# Patient Record
Sex: MALE | Race: ASIAN | NOT HISPANIC OR LATINO | URBAN - METROPOLITAN AREA
[De-identification: names, ages, dates, MRNs, and addresses within clinical notes are randomized per-mention and may not be internally consistent; named-entity substitution may affect disease eponyms.]

---

## 2023-07-26 ENCOUNTER — TELEPHONE (OUTPATIENT)
Dept: NEUROSURGERY | Facility: CLINIC | Age: 58
End: 2023-07-26
Payer: COMMERCIAL

## 2023-07-26 DIAGNOSIS — M54.2 CERVICALGIA: Primary | ICD-10-CM

## 2023-07-26 NOTE — TELEPHONE ENCOUNTER
Spoke to pt's friend and confirmed times, dates, and locations for XR and appt with Jaclyn for work up          ----- Message from Parul Humphries sent at 7/26/2023  9:56 AM CDT -----  Who Called: Pt friend, Togolese translation     What is the request in detail: Requesting call back to discuss being scheduled from referral. Pt needs MRI for shoulder. Please advise    Can the clinic reply by [facility]? No    Best Call Back Number: [phone]      Additional Information:

## 2023-08-02 ENCOUNTER — OFFICE VISIT (OUTPATIENT)
Dept: NEUROSURGERY | Facility: CLINIC | Age: 58
End: 2023-08-02
Payer: COMMERCIAL

## 2023-08-02 ENCOUNTER — HOSPITAL ENCOUNTER (OUTPATIENT)
Dept: RADIOLOGY | Facility: HOSPITAL | Age: 58
Discharge: HOME | End: 2023-08-02
Attending: NEUROLOGICAL SURGERY
Payer: COMMERCIAL

## 2023-08-02 VITALS
HEART RATE: 60 BPM | SYSTOLIC BLOOD PRESSURE: 120 MMHG | WEIGHT: 125 LBS | BODY MASS INDEX: 23 KG/M2 | TEMPERATURE: 97 F | DIASTOLIC BLOOD PRESSURE: 77 MMHG | HEIGHT: 62 IN

## 2023-08-02 DIAGNOSIS — M50.00 CERVICAL DISC DISEASE WITH MYELOPATHY: ICD-10-CM

## 2023-08-02 DIAGNOSIS — M54.2 CERVICALGIA: ICD-10-CM

## 2023-08-02 DIAGNOSIS — M54.2 CERVICALGIA: Primary | ICD-10-CM

## 2023-08-02 PROCEDURE — 1159F MED LIST DOCD IN RCRD: CPT | Mod: CPTII,S$GLB,, | Performed by: NEUROLOGICAL SURGERY

## 2023-08-02 PROCEDURE — 99204 OFFICE O/P NEW MOD 45 MIN: CPT | Mod: S$GLB,,, | Performed by: NEUROLOGICAL SURGERY

## 2023-08-02 PROCEDURE — 99204 PR OFFICE/OUTPT VISIT, NEW, LEVL IV, 45-59 MIN: ICD-10-PCS | Mod: S$GLB,,, | Performed by: NEUROLOGICAL SURGERY

## 2023-08-02 PROCEDURE — 1159F PR MEDICATION LIST DOCUMENTED IN MEDICAL RECORD: ICD-10-PCS | Mod: CPTII,S$GLB,, | Performed by: NEUROLOGICAL SURGERY

## 2023-08-02 PROCEDURE — 3078F PR MOST RECENT DIASTOLIC BLOOD PRESSURE < 80 MM HG: ICD-10-PCS | Mod: CPTII,S$GLB,, | Performed by: NEUROLOGICAL SURGERY

## 2023-08-02 PROCEDURE — 4010F PR ACE/ARB THEARPY RXD/TAKEN: ICD-10-PCS | Mod: CPTII,S$GLB,, | Performed by: NEUROLOGICAL SURGERY

## 2023-08-02 PROCEDURE — 4010F ACE/ARB THERAPY RXD/TAKEN: CPT | Mod: CPTII,S$GLB,, | Performed by: NEUROLOGICAL SURGERY

## 2023-08-02 PROCEDURE — 3008F BODY MASS INDEX DOCD: CPT | Mod: CPTII,S$GLB,, | Performed by: NEUROLOGICAL SURGERY

## 2023-08-02 PROCEDURE — 99999 PR PBB SHADOW E&M-EST. PATIENT-LVL III: ICD-10-PCS | Mod: PBBFAC,,, | Performed by: NEUROLOGICAL SURGERY

## 2023-08-02 PROCEDURE — 99999 PR PBB SHADOW E&M-EST. PATIENT-LVL III: CPT | Mod: PBBFAC,,, | Performed by: NEUROLOGICAL SURGERY

## 2023-08-02 PROCEDURE — 3008F PR BODY MASS INDEX (BMI) DOCUMENTED: ICD-10-PCS | Mod: CPTII,S$GLB,, | Performed by: NEUROLOGICAL SURGERY

## 2023-08-02 PROCEDURE — 72050 XR CERVICAL SPINE AP LAT WITH FLEX EXTEN: ICD-10-PCS | Mod: 26,,, | Performed by: RADIOLOGY

## 2023-08-02 PROCEDURE — 72050 X-RAY EXAM NECK SPINE 4/5VWS: CPT | Mod: 26,,, | Performed by: RADIOLOGY

## 2023-08-02 PROCEDURE — 3074F SYST BP LT 130 MM HG: CPT | Mod: CPTII,S$GLB,, | Performed by: NEUROLOGICAL SURGERY

## 2023-08-02 PROCEDURE — 3078F DIAST BP <80 MM HG: CPT | Mod: CPTII,S$GLB,, | Performed by: NEUROLOGICAL SURGERY

## 2023-08-02 PROCEDURE — 3074F PR MOST RECENT SYSTOLIC BLOOD PRESSURE < 130 MM HG: ICD-10-PCS | Mod: CPTII,S$GLB,, | Performed by: NEUROLOGICAL SURGERY

## 2023-08-02 PROCEDURE — 72050 X-RAY EXAM NECK SPINE 4/5VWS: CPT | Mod: TC

## 2023-08-02 RX ORDER — FLUTICASONE PROPIONATE 50 MCG
1 SPRAY, SUSPENSION (ML) NASAL 2 TIMES DAILY
COMMUNITY
Start: 2023-06-13

## 2023-08-02 RX ORDER — MELOXICAM 15 MG/1
15 TABLET ORAL
COMMUNITY
Start: 2023-06-13

## 2023-08-02 RX ORDER — ERGOCALCIFEROL 1.25 MG/1
50000 CAPSULE ORAL
COMMUNITY
Start: 2023-06-13

## 2023-08-02 RX ORDER — GABAPENTIN 300 MG/1
300 CAPSULE ORAL 3 TIMES DAILY
COMMUNITY
Start: 2023-03-11

## 2023-08-02 RX ORDER — AMLODIPINE AND BENAZEPRIL HYDROCHLORIDE 10; 20 MG/1; MG/1
1 CAPSULE ORAL
COMMUNITY
Start: 2023-06-13

## 2023-08-03 DIAGNOSIS — M50.10 CERVICAL DISC DISORDER WITH RADICULOPATHY: ICD-10-CM

## 2023-08-03 DIAGNOSIS — M47.12 SPONDYLOSIS, CERVICAL, WITH MYELOPATHY: Primary | ICD-10-CM

## 2023-08-03 DIAGNOSIS — M54.10 RADICULOPATHY, UNSPECIFIED SPINAL REGION: ICD-10-CM

## 2023-08-03 NOTE — PROGRESS NOTES
Neurosurgery  History & Physical    SUBJECTIVE:     Chief Complaint:  Patient was referred to us by his primary care physician for evaluation of cervical radiculopathy and cervical myelopathy.    History of Present Illness:  This is a 57-year-old German male who works as a .  Patient has had about a year history of worsening neck pain with radiation into the right arm.  He initially thought this was a shoulder problem and orthopedic workup which did not reveal anything significant with the shoulder.  He reports his pain has neck pain radiating into the right shoulder down the arm into the hands and fingers.  He is had physical therapy without relief.  He is had 2 injections without relief.  He is tried various medications.  Impairing his ability to work as a  now has to work light duties.  He would like to get back to his normal working job.  He denies any left-sided symptoms.  He is right-handed.  He does complain of increasing weakness of the right hand in his dropping things.    Review of patient's allergies indicates:  No Known Allergies    Current Outpatient Medications   Medication Sig Dispense Refill    amlodipine-benazepril 10-20mg (LOTREL) 10-20 mg per capsule Take 1 capsule by mouth.      fluticasone propionate (FLONASE) 50 mcg/actuation nasal spray 1 spray by Each Nostril route 2 (two) times daily.      gabapentin (NEURONTIN) 300 MG capsule Take 300 mg by mouth 3 (three) times daily.      meloxicam (MOBIC) 15 MG tablet Take 15 mg by mouth.      VITAMIN D2 1,250 mcg (50,000 unit) capsule Take 50,000 Units by mouth every 7 days.       No current facility-administered medications for this visit.       No past medical history on file.  No past surgical history on file.  Family History    None       Social History     Socioeconomic History    Marital status: Unknown   Tobacco Use    Smoking status: Never    Smokeless tobacco: Never       Review of Systems   Constitutional:  Positive for  "activity change.   Musculoskeletal:  Positive for neck pain and neck stiffness.   Neurological:  Positive for weakness and numbness.       OBJECTIVE:     Vital Signs  Temp: 97.1 °F (36.2 °C)  Pulse: 60  BP: 120/77  Pain Score:   2  Height: 5' 2" (157.5 cm)  Weight: 56.7 kg (125 lb)  Body mass index is 22.86 kg/m².      Neurosurgery Physical Exam    On exam he is awake alert appropriate  In slight Pineda's on the right none of the left  No clonus  He is got intrinsic hand weakness and some triceps weakness      Diagnostic Results:  His MRI scan done at outside facility was shows two-level significant disc disease at C5-6 and C6-7 with significant central and foraminal stenosis right worse than left.    ASSESSMENT/PLAN:     Patient with 2 level disc disease with evidence of cervical radiculopathy but also early signs of cervical myelopathy.  Patient has had over a year was symptoms with chance deficits in his failed physical therapy, medical treatment and injections.  This stage I think patient would benefit from 2 level cervical diskectomy and fusion C5-6 and C6-7.  I do not think he is a candidate for disc arthroplasty given how much facet degeneration I see on his imaging.    I have discussed the risks/benefits, indications, and alternatives for the proposed procedure in detail. I have answered all of their questions and patient wish to proceed with surgery. We will schedule patient.           Note dictated with voice recognition software, please excuse any grammatical errors.    "

## 2023-08-14 ENCOUNTER — TELEPHONE (OUTPATIENT)
Dept: NEUROSURGERY | Facility: CLINIC | Age: 58
End: 2023-08-14
Payer: COMMERCIAL

## 2023-08-14 NOTE — TELEPHONE ENCOUNTER
Spoke to patient's spouse. She said patient is not ready to proceed with surgery right now. She stated that they will call back when they are ready to reschedule surgery.          ----- Message from Paolo Dempsey MA sent at 8/9/2023  3:16 PM CDT -----    ----- Message -----  From: Nadege Jin RN  Sent: 8/9/2023   1:08 PM CDT  To: Inderjit Kohli MD; Nadege Jin RN; #    Spoke with patient with the assistance of the [place]  [facility] [id number] on 8/9. Patient stated would like to postpone surgery for 3-6 months.  He said he has notified Dr. Kohli's office of this.  Patient is still showing on the surgery schedule.  Thanks!